# Patient Record
Sex: MALE | Race: WHITE | NOT HISPANIC OR LATINO
[De-identification: names, ages, dates, MRNs, and addresses within clinical notes are randomized per-mention and may not be internally consistent; named-entity substitution may affect disease eponyms.]

---

## 2021-12-08 ENCOUNTER — RESULT REVIEW (OUTPATIENT)
Age: 56
End: 2021-12-08

## 2022-05-04 ENCOUNTER — APPOINTMENT (OUTPATIENT)
Dept: ORTHOPEDIC SURGERY | Facility: CLINIC | Age: 57
End: 2022-05-04
Payer: OTHER MISCELLANEOUS

## 2022-05-04 VITALS — WEIGHT: 261 LBS | BODY MASS INDEX: 34.59 KG/M2 | HEIGHT: 73 IN

## 2022-05-04 DIAGNOSIS — Z78.9 OTHER SPECIFIED HEALTH STATUS: ICD-10-CM

## 2022-05-04 PROBLEM — Z00.00 ENCOUNTER FOR PREVENTIVE HEALTH EXAMINATION: Status: ACTIVE | Noted: 2022-05-04

## 2022-05-04 PROCEDURE — 99072 ADDL SUPL MATRL&STAF TM PHE: CPT

## 2022-05-04 PROCEDURE — 99214 OFFICE O/P EST MOD 30 MIN: CPT

## 2022-05-04 NOTE — HISTORY OF PRESENT ILLNESS
[de-identified] : 55 yo male is s/p left shoulder arthroscopic rotator cuff repair utilizing one anchor with SAD performed on 12/8/2021 by Dr. Hogue which is the direct result of a work related injury which occurred on 3/4/2021. Patient is doing well. He has been completing PT with continued improvement. Patient has been taking 800mg Ibuprofen for intermittent pain relief. He remains out of work.\par Patient works as \par \par 5/4/22: Patient presents 6 months s/p left shoulder arthroscopic rotator cuff repair utilizing one anchor with SAD performed on 12/8/2021 by Dr. Hogue. He has been able to return to work full duty as of 4/18/22. ROM is improving with PT.

## 2022-05-04 NOTE — DISCUSSION/SUMMARY
[de-identified] : 57 yo male is 6 months s/p left shoulder arthroscopic rotator cuff repair utilizing one anchor with SAD performed on 12/8/2021 which is the direct result of a work related injury which occurred on 3/4/2021. \par The patient is doing well postoperatively. Pain is minimal \par Patient received an additional physical therapy prescription to work on ROM and strength.\par He will remain working full duty.\par Follow up 4-6 weeks \par \par \par (1) We discussed a comprehensive treatment plans that included a prescription management plan involving the use of prescription strength medications to include Ibuprofen 600-800 mg TID, versus 500-650 mg Tylenol. We also discussed prescribing topical diclofenac (Voltaren gel) as well as once daily Meloxicam 15 mg.\par (2) The patient has More Than One chronic injuries/illnesses as outlined, discussed, and documented by ICD 10 codes listed, as well as the HPI and Plan section.\par There is a moderate risk of morbidity with further treatment, especially from use of prescription strength medications and possible side effects of these medications which include upset stomach and cardiac/renal issues with long term use were discussed.\par (3) I recommended that the patient follow-up with their medical physician to discuss any significant specific potential issues with long term use such as interactions with current medications or with exacerbation of underlying medical morbidities. \par \par Attestation:\par I, Abby Parham , attest that this documentation has been prepared under the direction and in the presence of Provider Kendall Perez MD.\par The documentation recorded by the scribe, in my presence, accurately reflects the service I personally performed, and the decisions made by me with my edits as appropriate.\par Kendall Perez MD\par \par

## 2022-05-04 NOTE — PHYSICAL EXAM
[de-identified] : Constitutional: The general appearance of the patient is well developed, well nourished, no deformities and well groomed. Normal \par \par Gait: Gait and function is as follows: normal gait. \par \par Skin: Head and neck visualized skin is normal. Left upper extremity visualized skin is normal. Right upper extremity visualized skin is normal. Thoracic Skin of the thoracic spine shows visualized skin is normal. \par \par Cardiovascular: palpable radial pulse bilaterally, good capillary refill in digits of the bilateral upper extremities and no temperature or color changes in the bilateral upper extremities. \par \par Lymphatic: Normal Palpation of lymph nodes in the cervical. \par \par Neurologic: fine motor control in the bilateral upper extremities is intact. Deep Tendon Reflexes in Upper and Lower Extremities Negative Uriarte's in the bilateral upper extremities. The patient is oriented to time, place and person. Sensation to light touch intact in the bilateral upper extremities. Mood and Affect is normal. \par \par Right Shoulder:  Inspection of the shoulder/upper arm is as follows: There is a full, pain-free, stable range of motion of the shoulder with normal strength and no tenderness to palpation. \par \par Left Shoulder: Inspection of the shoulder/upper arm is as follows: no scapula winging, no biceps deformity and no AC joint deformity. Inspection of the wound reveals healed incision. Palpation of the shoulder/upper arm is as follows: There is tenderness at the AC joint and proximal biceps tendon. Range of motion of the shoulder is as follows: Limited range of motion compatible with recent surgery. Strength of the shoulder is as follows: Supraspinatus 4/5. External Rotation 4/5. Internal Rotation 4/5. Infraspinatus 5/5 4/5. Deltoid 5/5 Ligament Stability and Special Tests of the shoulder is as follows: stable shoulder. \par \par \par \par Neck: \par Inspection / Palpation of the cervical spine is as follows: There is a full, pain free, stable range of motion of the cervical spine with normal tone and no tenderness to palpation. \par \par \par \par Back, including spine: Inspection / Palpation of the thoracic/lumbar spine is as follows: There is a full, pain free, stable range of motion of the thoracic spine with a normal tone and not tenderness to palpation..\par

## 2022-06-22 ENCOUNTER — APPOINTMENT (OUTPATIENT)
Dept: ORTHOPEDIC SURGERY | Facility: CLINIC | Age: 57
End: 2022-06-22
Payer: OTHER MISCELLANEOUS

## 2022-06-22 VITALS — WEIGHT: 261 LBS | HEIGHT: 73 IN | BODY MASS INDEX: 34.59 KG/M2

## 2022-06-22 PROCEDURE — 99072 ADDL SUPL MATRL&STAF TM PHE: CPT

## 2022-06-22 PROCEDURE — 99214 OFFICE O/P EST MOD 30 MIN: CPT

## 2022-06-22 RX ORDER — MELOXICAM 15 MG/1
15 TABLET ORAL
Qty: 30 | Refills: 0 | Status: ACTIVE | COMMUNITY
Start: 2022-06-22 | End: 1900-01-01

## 2022-06-22 NOTE — HISTORY OF PRESENT ILLNESS
[de-identified] : 55 yo male is s/p left shoulder arthroscopic rotator cuff repair utilizing one anchor with SAD performed on 12/8/2021 by Dr. Hogue which is the direct result of a work related injury which occurred on 3/4/2021. Patient is doing well. He has been completing PT with continued improvement. Patient has been taking 800mg Ibuprofen for intermittent pain relief. He remains out of work.\par Patient works as \par \par 5/4/22: Patient presents 6 months s/p left shoulder arthroscopic rotator cuff repair utilizing one anchor with SAD performed on 12/8/2021 by Dr. Hogue. He has been able to return to work full duty as of 4/18/22. ROM is improving with PT. \par 6/22/22: Patient presents 6.5 months s/p left shoulder arthroscopic rotator cuff repair utilizing one anchor with SAD by Dr. Iqbal. Patient is doing well. ROM continues to improve. Has mild residual stiffness with motion overhead and abduction.

## 2022-06-22 NOTE — DISCUSSION/SUMMARY
[de-identified] : 55 yo male is 6.5 months s/p left shoulder arthroscopic rotator cuff repair utilizing one anchor with SAD performed on 12/8/2021 which is the direct result of a work related injury which occurred on 3/4/2021. \par The patient is doing well postoperatively. Pain is minimal \par Patient received an additional physical therapy prescription to work on ROM and strength.\par He will remain working full duty.\par Follow up 6 weeks \par \par \par (1) We discussed a comprehensive treatment plans that included a prescription management plan involving the use of prescription strength medications to include Ibuprofen 600-800 mg TID, versus 500-650 mg Tylenol. We also discussed prescribing topical diclofenac (Voltaren gel) as well as once daily Meloxicam 15 mg.\par (2) The patient has More Than One chronic injuries/illnesses as outlined, discussed, and documented by ICD 10 codes listed, as well as the HPI and Plan section.\par There is a moderate risk of morbidity with further treatment, especially from use of prescription strength medications and possible side effects of these medications which include upset stomach and cardiac/renal issues with long term use were discussed.\par (3) I recommended that the patient follow-up with their medical physician to discuss any significant specific potential issues with long term use such as interactions with current medications or with exacerbation of underlying medical morbidities. \par \par Attestation:\par I, Abby Parham , attest that this documentation has been prepared under the direction and in the presence of Provider Kendall Perez MD.\par The documentation recorded by the scribe, in my presence, accurately reflects the service I personally performed, and the decisions made by me with my edits as appropriate.\par Kendall Perez MD\par \par

## 2022-06-22 NOTE — PHYSICAL EXAM
[de-identified] : Constitutional: The general appearance of the patient is well developed, well nourished, no deformities and well groomed. Normal \par \par Gait: Gait and function is as follows: normal gait. \par \par Skin: Head and neck visualized skin is normal. Left upper extremity visualized skin is normal. Right upper extremity visualized skin is normal. Thoracic Skin of the thoracic spine shows visualized skin is normal. \par \par Cardiovascular: palpable radial pulse bilaterally, good capillary refill in digits of the bilateral upper extremities and no temperature or color changes in the bilateral upper extremities. \par \par Lymphatic: Normal Palpation of lymph nodes in the cervical. \par \par Neurologic: fine motor control in the bilateral upper extremities is intact. Deep Tendon Reflexes in Upper and Lower Extremities Negative Uriarte's in the bilateral upper extremities. The patient is oriented to time, place and person. Sensation to light touch intact in the bilateral upper extremities. Mood and Affect is normal. \par \par Right Shoulder:  Inspection of the shoulder/upper arm is as follows: There is a full, pain-free, stable range of motion of the shoulder with normal strength and no tenderness to palpation. \par \par Left Shoulder: Inspection of the shoulder/upper arm is as follows: no scapula winging, no biceps deformity and no AC joint deformity. Inspection of the wound reveals healed incision. Palpation of the shoulder/upper arm is as follows: There is tenderness at the AC joint and proximal biceps tendon. Range of motion of the shoulder is as follows: Limited range of motion compatible with recent surgery. Strength of the shoulder is as follows: Supraspinatus 4/5. External Rotation 4/5. Internal Rotation 4/5. Infraspinatus 5/5 4/5. Deltoid 5/5 Ligament Stability and Special Tests of the shoulder is as follows: stable shoulder. \par \par \par \par Neck: \par Inspection / Palpation of the cervical spine is as follows: There is a full, pain free, stable range of motion of the cervical spine with normal tone and no tenderness to palpation. \par \par \par \par Back, including spine: Inspection / Palpation of the thoracic/lumbar spine is as follows: There is a full, pain free, stable range of motion of the thoracic spine with a normal tone and not tenderness to palpation..\par

## 2022-08-03 ENCOUNTER — APPOINTMENT (OUTPATIENT)
Dept: ORTHOPEDIC SURGERY | Facility: CLINIC | Age: 57
End: 2022-08-03

## 2022-08-03 VITALS — BODY MASS INDEX: 34.59 KG/M2 | HEIGHT: 73 IN | WEIGHT: 261 LBS

## 2022-08-03 PROCEDURE — 99072 ADDL SUPL MATRL&STAF TM PHE: CPT

## 2022-08-03 PROCEDURE — 99214 OFFICE O/P EST MOD 30 MIN: CPT

## 2022-08-03 NOTE — DISCUSSION/SUMMARY
[de-identified] : 57 yo male is 8 months s/p left shoulder arthroscopic rotator cuff repair utilizing one anchor with SAD performed on 12/8/2021 which is the direct result of a work related injury which occurred on 3/4/2021. \par The patient is doing well postoperatively. Pain is minimal \par ROM is progressing as expected \par Patient received an additional physical therapy prescription to work on ROM and strength.\par He has stopped taking all NSAIDs due to suspected BP issues \par Advised to discuss with his PCP for long term NSAID usage \par He will remain working full duty.\par MMI 1 yr from surgery\par \par \par (1) We discussed a comprehensive treatment plans that included a prescription management plan involving the use of prescription strength medications to include Ibuprofen 600-800 mg TID, versus 500-650 mg Tylenol. We also discussed prescribing topical diclofenac (Voltaren gel) as well as once daily Meloxicam 15 mg.\par (2) The patient has More Than One chronic injuries/illnesses as outlined, discussed, and documented by ICD 10 codes listed, as well as the HPI and Plan section.\par There is a moderate risk of morbidity with further treatment, especially from use of prescription strength medications and possible side effects of these medications which include upset stomach and cardiac/renal issues with long term use were discussed.\par (3) I recommended that the patient follow-up with their medical physician to discuss any significant specific potential issues with long term use such as interactions with current medications or with exacerbation of underlying medical morbidities. \par \par Attestation:\par I, Abby Parham , attest that this documentation has been prepared under the direction and in the presence of Provider Kendall Perez MD.\par The documentation recorded by the scribe, in my presence, accurately reflects the service I personally performed, and the decisions made by me with my edits as appropriate.\par Kendall Perez MD\par \par

## 2022-08-03 NOTE — PHYSICAL EXAM
[de-identified] : Constitutional: The general appearance of the patient is well developed, well nourished, no deformities and well groomed. Normal \par \par Gait: Gait and function is as follows: normal gait. \par \par Skin: Head and neck visualized skin is normal. Left upper extremity visualized skin is normal. Right upper extremity visualized skin is normal. Thoracic Skin of the thoracic spine shows visualized skin is normal. \par \par Cardiovascular: palpable radial pulse bilaterally, good capillary refill in digits of the bilateral upper extremities and no temperature or color changes in the bilateral upper extremities. \par \par Lymphatic: Normal Palpation of lymph nodes in the cervical. \par \par Neurologic: fine motor control in the bilateral upper extremities is intact. Deep Tendon Reflexes in Upper and Lower Extremities Negative Uriarte's in the bilateral upper extremities. The patient is oriented to time, place and person. Sensation to light touch intact in the bilateral upper extremities. Mood and Affect is normal. \par \par Right Shoulder:  Inspection of the shoulder/upper arm is as follows: There is a full, pain-free, stable range of motion of the shoulder with normal strength and no tenderness to palpation. \par \par Left Shoulder: Inspection of the shoulder/upper arm is as follows: no scapula winging, no biceps deformity and no AC joint deformity. Inspection of the wound reveals healed incision. Palpation of the shoulder/upper arm is as follows: There is tenderness at the AC joint and proximal biceps tendon. Range of motion of the shoulder is as follows: Limited range of motion compatible with recent surgery. Strength of the shoulder is as follows: Supraspinatus 4/5. External Rotation 4/5. Internal Rotation 4/5. Infraspinatus 5/5 4/5. Deltoid 5/5 Ligament Stability and Special Tests of the shoulder is as follows: stable shoulder. \par \par \par \par Neck: \par Inspection / Palpation of the cervical spine is as follows: There is a full, pain free, stable range of motion of the cervical spine with normal tone and no tenderness to palpation. \par \par \par \par Back, including spine: Inspection / Palpation of the thoracic/lumbar spine is as follows: There is a full, pain free, stable range of motion of the thoracic spine with a normal tone and not tenderness to palpation..\par

## 2022-08-03 NOTE — HISTORY OF PRESENT ILLNESS
[de-identified] : 55 yo male is s/p left shoulder arthroscopic rotator cuff repair utilizing one anchor with SAD performed on 12/8/2021 by Dr. Hogue which is the direct result of a work related injury which occurred on 3/4/2021. Patient is doing well. He has been completing PT with continued improvement. Patient has been taking 800mg Ibuprofen for intermittent pain relief. He remains out of work.\par Patient works as \par \par 5/4/22: Patient presents 6 months s/p left shoulder arthroscopic rotator cuff repair utilizing one anchor with SAD performed on 12/8/2021 by Dr. Hogue. He has been able to return to work full duty as of 4/18/22. ROM is improving with PT. \par 6/22/22: Patient presents 6.5 months s/p left shoulder arthroscopic rotator cuff repair utilizing one anchor with SAD by Dr. Iqbal. Patient is doing well. ROM continues to improve. Has mild residual stiffness with motion overhead and abduction. \par 8/3/22: Patient presents 8 months s/p left shoulder arthroscopic rotator cuff repair utilizing one anchor with SAD by Dr. Iqbal. Patient is doing well. ROM is progressing as expected. He states he did not go to his last PT appointment due to a family issue.

## 2022-09-14 ENCOUNTER — APPOINTMENT (OUTPATIENT)
Dept: ORTHOPEDIC SURGERY | Facility: CLINIC | Age: 57
End: 2022-09-14

## 2022-09-14 VITALS — HEIGHT: 73 IN | WEIGHT: 261 LBS | BODY MASS INDEX: 34.59 KG/M2

## 2022-09-14 PROCEDURE — 99214 OFFICE O/P EST MOD 30 MIN: CPT

## 2022-09-14 PROCEDURE — 99072 ADDL SUPL MATRL&STAF TM PHE: CPT

## 2022-09-14 NOTE — DISCUSSION/SUMMARY
[de-identified] : 55 yo male is 9 months s/p left shoulder arthroscopic rotator cuff repair utilizing one anchor with SAD performed on 12/8/2021 which is the direct result of a work related injury which occurred on 3/4/2021. \par The patient is doing well postoperatively. Pain is minimal \par ROM and strength is progressing as expected \par Patient will continue with HEP at this point \par He has stopped taking all NSAIDs due to suspected BP issues \par Advised to discuss with his PCP for long term NSAID usage \par He will remain working full duty.\par Discussed with the patient maximum medical improvement is achieved 1 year from date of surgery \par \par \par (1) We discussed a comprehensive treatment plans that included a prescription management plan involving the use of prescription strength medications to include Ibuprofen 600-800 mg TID, versus 500-650 mg Tylenol. We also discussed prescribing topical diclofenac (Voltaren gel) as well as once daily Meloxicam 15 mg.\par (2) The patient has More Than One chronic injuries/illnesses as outlined, discussed, and documented by ICD 10 codes listed, as well as the HPI and Plan section.\par There is a moderate risk of morbidity with further treatment, especially from use of prescription strength medications and possible side effects of these medications which include upset stomach and cardiac/renal issues with long term use were discussed.\par (3) I recommended that the patient follow-up with their medical physician to discuss any significant specific potential issues with long term use such as interactions with current medications or with exacerbation of underlying medical morbidities. \par \par Attestation:\par I, Abby Parham , attest that this documentation has been prepared under the direction and in the presence of Provider Kendall Perez MD.\par The documentation recorded by the scribe, in my presence, accurately reflects the service I personally performed, and the decisions made by me with my edits as appropriate.\par Kendall Perez MD\par \par

## 2022-09-14 NOTE — PHYSICAL EXAM
[de-identified] : Constitutional: The general appearance of the patient is well developed, well nourished, no deformities and well groomed. Normal \par \par Gait: Gait and function is as follows: normal gait. \par \par Skin: Head and neck visualized skin is normal. Left upper extremity visualized skin is normal. Right upper extremity visualized skin is normal. Thoracic Skin of the thoracic spine shows visualized skin is normal. \par \par Cardiovascular: palpable radial pulse bilaterally, good capillary refill in digits of the bilateral upper extremities and no temperature or color changes in the bilateral upper extremities. \par \par Lymphatic: Normal Palpation of lymph nodes in the cervical. \par \par Neurologic: fine motor control in the bilateral upper extremities is intact. Deep Tendon Reflexes in Upper and Lower Extremities Negative Uriarte's in the bilateral upper extremities. The patient is oriented to time, place and person. Sensation to light touch intact in the bilateral upper extremities. Mood and Affect is normal. \par \par Right Shoulder:  Inspection of the shoulder/upper arm is as follows: There is a full, pain-free, stable range of motion of the shoulder with normal strength and no tenderness to palpation. \par \par Left Shoulder: Inspection of the shoulder/upper arm is as follows: no scapula winging, no biceps deformity and no AC joint deformity. Inspection of the wound reveals healed incision. Palpation of the shoulder/upper arm is as follows: There is tenderness at the AC joint and proximal biceps tendon. Range of motion of the shoulder is as follows: Limited range of motion compatible with recent surgery. Strength of the shoulder is as follows: Supraspinatus 4/5. External Rotation 4/5. Internal Rotation 4/5. Infraspinatus 5/5 4/5. Deltoid 5/5 Ligament Stability and Special Tests of the shoulder is as follows: stable shoulder. \par \par \par \par Neck: \par Inspection / Palpation of the cervical spine is as follows: There is a full, pain free, stable range of motion of the cervical spine with normal tone and no tenderness to palpation. \par \par \par \par Back, including spine: Inspection / Palpation of the thoracic/lumbar spine is as follows: There is a full, pain free, stable range of motion of the thoracic spine with a normal tone and not tenderness to palpation..\par

## 2022-09-14 NOTE — HISTORY OF PRESENT ILLNESS
[de-identified] : 57 yo male is s/p left shoulder arthroscopic rotator cuff repair utilizing one anchor with SAD performed on 12/8/2021 by Dr. Hogue which is the direct result of a work related injury which occurred on 3/4/2021. Patient is doing well. He has been completing PT with continued improvement. Patient has been taking 800mg Ibuprofen for intermittent pain relief. He remains out of work.\par Patient works as \par \par 5/4/22: Patient presents 6 months s/p left shoulder arthroscopic rotator cuff repair utilizing one anchor with SAD performed on 12/8/2021 by Dr. Hogue. He has been able to return to work full duty as of 4/18/22. ROM is improving with PT. \par 6/22/22: Patient presents 6.5 months s/p left shoulder arthroscopic rotator cuff repair utilizing one anchor with SAD by Dr. Iqbal. Patient is doing well. ROM continues to improve. Has mild residual stiffness with motion overhead and abduction. \par 8/3/22: Patient presents 8 months s/p left shoulder arthroscopic rotator cuff repair utilizing one anchor with SAD by Dr. Iqbal. Patient is doing well. ROM is progressing as expected. He states he did not go to his last PT appointment due to a family issue.\par 9/14/22: Patient presents 9 months s/p left shoulder arthroscopic rotator cuff repair utilizing one anchor with SAD by Dr. Iqbal. Patient is doing well. He states he has not been to PT for the last 4 weeks due to multiple family circumstances. ROM and strength is progressing as expected

## 2022-10-13 ENCOUNTER — FORM ENCOUNTER (OUTPATIENT)
Age: 57
End: 2022-10-13

## 2022-11-16 ENCOUNTER — APPOINTMENT (OUTPATIENT)
Dept: ORTHOPEDIC SURGERY | Facility: CLINIC | Age: 57
End: 2022-11-16

## 2022-11-16 VITALS — BODY MASS INDEX: 34.59 KG/M2 | WEIGHT: 261 LBS | HEIGHT: 73 IN

## 2022-11-16 DIAGNOSIS — M75.52 BURSITIS OF LEFT SHOULDER: ICD-10-CM

## 2022-11-16 DIAGNOSIS — M75.02 ADHESIVE CAPSULITIS OF LEFT SHOULDER: ICD-10-CM

## 2022-11-16 DIAGNOSIS — Z98.890 OTHER SPECIFIED POSTPROCEDURAL STATES: ICD-10-CM

## 2022-11-16 PROCEDURE — 99072 ADDL SUPL MATRL&STAF TM PHE: CPT

## 2022-11-16 PROCEDURE — 99455 WORK RELATED DISABILITY EXAM: CPT

## 2022-11-16 NOTE — WORK
[Partial] : partial [Has the patient reached Maximum Medical Improvement? If yes, indicate date___] : Yes, on [unfilled] [Is there permanent partial impairment?] : Yes [FreeTextEntry6] : 40% Left shoulder SLU

## 2022-11-16 NOTE — PHYSICAL EXAM
[de-identified] : Constitutional: The general appearance of the patient is well developed, well nourished, no deformities and well groomed. Normal \par \par Gait: Gait and function is as follows: normal gait. \par \par Skin: Head and neck visualized skin is normal. Left upper extremity visualized skin is normal. Right upper extremity visualized skin is normal. Thoracic Skin of the thoracic spine shows visualized skin is normal. \par \par Cardiovascular: palpable radial pulse bilaterally, good capillary refill in digits of the bilateral upper extremities and no temperature or color changes in the bilateral upper extremities. \par \par Lymphatic: Normal Palpation of lymph nodes in the cervical. \par \par Neurologic: fine motor control in the bilateral upper extremities is intact. Deep Tendon Reflexes in Upper and Lower Extremities Negative Uriarte's in the bilateral upper extremities. The patient is oriented to time, place and person. Sensation to light touch intact in the bilateral upper extremities. Mood and Affect is normal. \par \par Right Shoulder:  Inspection of the shoulder/upper arm is as follows: There is a full, pain-free, stable range of motion of the shoulder with normal strength and no tenderness to palpation. \par \par Left Shoulder: Inspection of the shoulder/upper arm is as follows: no scapula winging, no biceps deformity and no AC joint deformity. Inspection of the wound reveals healed incision. Palpation of the shoulder/upper arm is as follows: There is tenderness at the AC joint and proximal biceps tendon. Range of motion of the shoulder is as follows: Limited range of motion compatible with recent surgery. Strength of the shoulder is as follows: Supraspinatus 4/5. External Rotation 4/5. Internal Rotation 4/5. Infraspinatus 5/5 4/5. Deltoid 5/5 Ligament Stability and Special Tests of the shoulder is as follows: stable shoulder. \par \par \par \par Neck: \par Inspection / Palpation of the cervical spine is as follows: There is a full, pain free, stable range of motion of the cervical spine with normal tone and no tenderness to palpation. \par \par \par \par Back, including spine: Inspection / Palpation of the thoracic/lumbar spine is as follows: There is a full, pain free, stable range of motion of the thoracic spine with a normal tone and not tenderness to palpation..\par

## 2022-11-16 NOTE — DISCUSSION/SUMMARY
[de-identified] : 57 yo male is 11 months s/p left shoulder arthroscopic rotator cuff repair utilizing one anchor with SAD performed on 12/8/2021 which is the direct result of a work related injury which occurred on 3/4/2021. \par The patient is doing well postoperatively. Pain is minimal \par ROM and strength is progressing as expected \par Patient will continue with HEP at this point \par He will remain working full duty.\par patient has reached maximum medical improvement and is amendable to a scheduled loss of use \par \par LUE (injured side)\par Active:  | Abduction: 90 | ER @ 0 abduction: 30 | ER @90 abduction: 20 | IR to L5 (20 deg)\par Passive:  | Abduction: 90 | ER @ 0 abduction: 40 | ER @90 abduction: 20 | IR to L5 (20 deg)\par \par RUE Uninvolved: Active:  | Abduction: 130 | ER @ 0 abduction: 50 | ER @90 abduction: 50 | IR to L1 (40 deg)\par Passive:  | Abduction: 140 | ER @ 0 abduction: 50 | ER @90 abduction: 50 | IR to L1 (40 deg)\par \par The Patient has moderate loss of External rotation and Internal rotation, as well as moderate \par flexion and abduction \par SLU Left shoulder 40%\par \par (1) We discussed a comprehensive treatment plans that included a prescription management plan involving the\par use of prescription strength medications to include Ibuprofen 600-800 mg TID, versus 500-650 mg Tylenol. We\par also discussed prescribing topical diclofenac (Voltaren gel) as well as once daily Meloxicam 15 mg.\par (2) The patient has More Than One chronic injuries/illnesses as outlined, discussed, and documented by ICD 10\par codes listed, as well as the HPI and Plan section.\par There is a moderate risk of morbidity with further treatment, especially from use of prescription strength\par medications and possible side effects of these medications which include upset stomach and cardiac/renal issues\par with long term use were discussed.\par (3) I recommended that the patient follow-up with their medical physician to discuss any significant specific\par potential issues with long term use such as interactions with current medications or with exacerbation of\par underlying medical morbidities. \par  \par \par

## 2022-11-16 NOTE — HISTORY OF PRESENT ILLNESS
[de-identified] : 55 yo male is s/p left shoulder arthroscopic rotator cuff repair utilizing one anchor with SAD performed on 12/8/2021 by Dr. Hogue which is the direct result of a work related injury which occurred on 3/4/2021. Patient is doing well. He has been completing PT with continued improvement. Patient has been taking 800mg Ibuprofen for intermittent pain relief. He remains out of work.\par Patient works as \par \par 5/4/22: Patient presents 6 months s/p left shoulder arthroscopic rotator cuff repair utilizing one anchor with SAD performed on 12/8/2021 by Dr. Hogue. He has been able to return to work full duty as of 4/18/22. ROM is improving with PT. \par 6/22/22: Patient presents 6.5 months s/p left shoulder arthroscopic rotator cuff repair utilizing one anchor with SAD by Dr. Iqbal. Patient is doing well. ROM continues to improve. Has mild residual stiffness with motion overhead and abduction. \par 8/3/22: Patient presents 8 months s/p left shoulder arthroscopic rotator cuff repair utilizing one anchor with SAD by Dr. Iqbal. Patient is doing well. ROM is progressing as expected. He states he did not go to his last PT appointment due to a family issue.\par 9/14/22: Patient presents 9 months s/p left shoulder arthroscopic rotator cuff repair utilizing one anchor with SAD by Dr. Iqbal. Patient is doing well. He states he has not been to PT for the last 4 weeks due to multiple family circumstances. ROM and strength is progressing as expected \par \par 11/16/22: Patient presents 11 months s/p left shoulder arthroscopic rotator cuff repair utilizing one anchor with SAD by Dr. Iqbal. Patient is doing well. He admits to significant relief compared to prior to surgery